# Patient Record
Sex: FEMALE | Race: ASIAN | NOT HISPANIC OR LATINO | Employment: PART TIME | URBAN - METROPOLITAN AREA
[De-identification: names, ages, dates, MRNs, and addresses within clinical notes are randomized per-mention and may not be internally consistent; named-entity substitution may affect disease eponyms.]

---

## 2024-05-15 ENCOUNTER — TELEPHONE (OUTPATIENT)
Dept: TRANSPLANT | Facility: CLINIC | Age: 24
End: 2024-05-15

## 2024-05-20 ENCOUNTER — TELEPHONE (OUTPATIENT)
Dept: TRANSPLANT | Facility: CLINIC | Age: 24
End: 2024-05-20
Payer: COMMERCIAL

## 2024-05-22 ENCOUNTER — TELEPHONE (OUTPATIENT)
Dept: TRANSPLANT | Facility: CLINIC | Age: 24
End: 2024-05-22
Payer: COMMERCIAL

## 2024-05-28 ENCOUNTER — TELEPHONE (OUTPATIENT)
Dept: TRANSPLANT | Facility: CLINIC | Age: 24
End: 2024-05-28
Payer: COMMERCIAL

## 2024-05-29 ENCOUNTER — TELEPHONE (OUTPATIENT)
Dept: TRANSPLANT | Facility: CLINIC | Age: 24
End: 2024-05-29
Payer: COMMERCIAL

## 2024-05-30 ENCOUNTER — TELEPHONE (OUTPATIENT)
Dept: TRANSPLANT | Facility: CLINIC | Age: 24
End: 2024-05-30
Payer: COMMERCIAL

## 2024-06-04 DIAGNOSIS — Z76.82 ORGAN TRANSPLANT CANDIDATE: Primary | ICD-10-CM

## 2024-06-14 ENCOUNTER — TELEPHONE (OUTPATIENT)
Dept: TRANSPLANT | Facility: CLINIC | Age: 24
End: 2024-06-14
Payer: COMMERCIAL

## 2024-06-18 ENCOUNTER — TELEPHONE (OUTPATIENT)
Dept: TRANSPLANT | Facility: CLINIC | Age: 24
End: 2024-06-18
Payer: COMMERCIAL

## 2024-06-18 NOTE — TELEPHONE ENCOUNTER
MA notes per Pre Dialysis adherence form     FOR THE PAST THREE MONTHS:    0-Appt Adhrence  0-No show    No concerns with labs, care giving, transportation, or mental health    Per adherence form pt would be excellent candidate for kidney transplant.     Scanned in pt's media     Luanne Caro  Abdominal Transplant MA

## 2024-06-19 ENCOUNTER — OFFICE VISIT (OUTPATIENT)
Dept: TRANSPLANT | Facility: CLINIC | Age: 24
End: 2024-06-19
Payer: COMMERCIAL

## 2024-06-19 ENCOUNTER — TELEPHONE (OUTPATIENT)
Dept: TRANSPLANT | Facility: CLINIC | Age: 24
End: 2024-06-19
Payer: COMMERCIAL

## 2024-06-19 ENCOUNTER — HOSPITAL ENCOUNTER (OUTPATIENT)
Dept: RADIOLOGY | Facility: HOSPITAL | Age: 24
Discharge: HOME | End: 2024-06-19
Payer: COMMERCIAL

## 2024-06-19 VITALS
TEMPERATURE: 97 F | HEART RATE: 98 BPM | DIASTOLIC BLOOD PRESSURE: 70 MMHG | WEIGHT: 209.69 LBS | HEIGHT: 62 IN | BODY MASS INDEX: 38.59 KG/M2 | RESPIRATION RATE: 16 BRPM | OXYGEN SATURATION: 100 % | SYSTOLIC BLOOD PRESSURE: 148 MMHG

## 2024-06-19 DIAGNOSIS — E28.2 PCOS (POLYCYSTIC OVARIAN SYNDROME): ICD-10-CM

## 2024-06-19 DIAGNOSIS — N18.5 STAGE 5 CHRONIC KIDNEY DISEASE: Chronic | ICD-10-CM

## 2024-06-19 DIAGNOSIS — Q61.5 NEPHRONOPHTHISIS: ICD-10-CM

## 2024-06-19 DIAGNOSIS — Z76.82 ORGAN TRANSPLANT CANDIDATE: ICD-10-CM

## 2024-06-19 DIAGNOSIS — Z01.818 PRE-TRANSPLANT EVALUATION FOR KIDNEY TRANSPLANT: Primary | ICD-10-CM

## 2024-06-19 DIAGNOSIS — I15.0 RENOVASCULAR HYPERTENSION: ICD-10-CM

## 2024-06-19 PROBLEM — I10 HTN (HYPERTENSION): Status: ACTIVE | Noted: 2024-06-19

## 2024-06-19 PROBLEM — D64.9 ANEMIA: Status: ACTIVE | Noted: 2024-06-19

## 2024-06-19 PROBLEM — E87.22 CHRONIC METABOLIC ACIDOSIS: Status: ACTIVE | Noted: 2024-04-02

## 2024-06-19 PROBLEM — J45.20 MILD INTERMITTENT ASTHMA WITHOUT COMPLICATION: Status: ACTIVE | Noted: 2018-11-19

## 2024-06-19 PROBLEM — R80.8 OTHER PROTEINURIA: Status: ACTIVE | Noted: 2024-04-02

## 2024-06-19 PROCEDURE — 71046 X-RAY EXAM CHEST 2 VIEWS: CPT | Mod: TC,TXP

## 2024-06-19 PROCEDURE — 72192 CT PELVIS W/O DYE: CPT | Mod: TC,TXP

## 2024-06-19 PROCEDURE — 99999 PR PBB SHADOW E&M-EST. PATIENT-LVL IV: CPT | Mod: PBBFAC,TXP,, | Performed by: NURSE PRACTITIONER

## 2024-06-19 PROCEDURE — 99204 OFFICE O/P NEW MOD 45 MIN: CPT | Mod: S$GLB,TXP,, | Performed by: PHYSICIAN ASSISTANT

## 2024-06-19 RX ORDER — LISINOPRIL 5 MG/1
1 TABLET ORAL DAILY
COMMUNITY
Start: 2024-04-02

## 2024-06-19 RX ORDER — SODIUM BICARBONATE 650 MG/1
650 TABLET ORAL 2 TIMES DAILY
COMMUNITY
Start: 2024-04-02

## 2024-06-19 RX ORDER — NORELGESTROMIN AND ETHINYL ESTRADIOL 35; 150 UG/MG; UG/MG
1 PATCH TRANSDERMAL WEEKLY
COMMUNITY
Start: 2021-10-18 | End: 2024-06-19

## 2024-06-19 RX ORDER — TAZAROTENE 0.45 MG/G
LOTION TOPICAL NIGHTLY
COMMUNITY

## 2024-06-19 RX ORDER — DAPSONE 75 MG/G
GEL TOPICAL DAILY
COMMUNITY

## 2024-06-19 RX ORDER — NORELGESTROMIN AND ETHINYL ESTRADIOL 35; 150 UG/MG; UG/MG
1 PATCH TRANSDERMAL WEEKLY
COMMUNITY

## 2024-06-19 RX ORDER — ALBUTEROL SULFATE 90 UG/1
1-2 AEROSOL, METERED RESPIRATORY (INHALATION)
COMMUNITY
Start: 2024-03-25

## 2024-06-19 RX ORDER — LORATADINE 10 MG/1
1 TABLET ORAL DAILY PRN
COMMUNITY

## 2024-06-19 NOTE — PROGRESS NOTES
PRE-TRANSPLANT INFECTIOUS DISEASE CONSULT    Reason for Visit:  Pre-transplant evaluation  Referring Provider: Dr. Dio Ruth *     History of Present Illness:    24 y.o. female with a history of CKD not on HD presents for pre-kidney transplant evaluation.    Infectious History:  Recent hospital admissions: No  Recent infections: No  Recent or current antibiotic use: No  History of recurrent infections *(sinus / pneumonia / UTI / SBP)*: No  History of diabetic foot wound or bone/joint infection: No  Recent dental infections, issues or procedures: No  History of chicken pox: No  History of shingles: No  History of STI: No  History of COVID infection: No    History of Immunosuppression:  Prior chemotherapy / immunosuppression: No  Prior transplant: No  History of splenectomy: No    Tuberculosis:  Prior screening for latent TB: No  Prior diagnosis of latent TB: No  Risk factors for TB *known exposure, incarceration, homelessness*: No    Geographical exposures:  Currently lives in [place],  with parents  Lived in the following states: [place], [place] as child (adopted at 1yr)  Lived or travelled to the [facility] [place]: No  International travel: No  Travel-associated illness: No    Social/Environmental:  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  Yes - cooked  Tobacco: No  Alcohol: No  Recreational drug use:  No  Sexual partners: yes - male      Past Histories:   Past Medical History:   Diagnosis Date    Allergy     Seasonal    Anemia     Asthma     Disorder of kidney and ureter     Metabolic acidosis     Nephronophthisis     PCOS (polycystic ovarian syndrome)      Past Surgical History:   Procedure Laterality Date    HERNIA REPAIR      inguinal, as a child    TONSILLECTOMY      WISDOM TOOTH EXTRACTION       Family History   Adopted: Yes    Family history unknown: Yes     Social History     Tobacco Use    Smoking status: Never    Smokeless tobacco: Never   Substance Use Topics    Alcohol use: Not Currently    Drug use: Never     Review of patient's allergies indicates:   Allergen Reactions    Codeine Nausea And Vomiting         Current antibiotics:  Antibiotics (From admission, onward)      None              Review of Systems  Review of Systems   Constitutional: Negative for chills, fever, malaise/fatigue and night sweats.   Cardiovascular:  Negative for chest pain.   Respiratory:  Negative for cough, hemoptysis, shortness of breath, sputum production and wheezing.    Skin:  Negative for rash and suspicious lesions.   Gastrointestinal:  Negative for abdominal pain, constipation, diarrhea, heartburn, nausea and vomiting.   Genitourinary:  Negative for dysuria and hematuria.          Objective  Physical Exam  Constitutional:       General: She is not in acute distress.     Appearance: Normal appearance. She is well-developed. She is not ill-appearing, toxic-appearing or diaphoretic.       HENT:      Head: Normocephalic and atraumatic.      Mouth/Throat:      Lips: Pink. No lesions.      Mouth: Mucous membranes are moist. No injury or oral lesions.      Dentition: Does not have dentures. No gingival swelling, dental caries, dental abscesses or gum lesions.      Tongue: No lesions.      Palate: No lesions.      Pharynx: Oropharynx is clear.   Cardiovascular:      Rate and Rhythm: Normal rate and regular rhythm.      Heart sounds: Normal heart sounds. No murmur heard.     No friction rub. No gallop.   Pulmonary:      Effort: Pulmonary effort is normal. No respiratory distress.      Breath sounds: Normal breath sounds. No wheezing or rales.   Abdominal:      General: Bowel sounds are normal. There is no distension.      Palpations: Abdomen is soft. There is no mass.      Tenderness: There is no abdominal tenderness. There is no guarding or rebound.  "  Skin:     General: Skin is warm and dry.   Neurological:      Mental Status: She is alert and oriented to person, place, and time.   Psychiatric:         Behavior: Behavior normal.         Labs:    CBC:   Lab Results   Component Value Date    WBC 12.33 06/19/2024    HGB 8.7 (L) 06/19/2024    HCT 30.3 (L) 06/19/2024    MCV 71 (L) 06/19/2024     06/19/2024    GRAN 8.6 (H) 06/19/2024    GRAN 69.5 06/19/2024    LYMPH 3.0 06/19/2024    LYMPH 24.0 06/19/2024    MONO 0.5 06/19/2024    MONO 4.2 06/19/2024    EOSINOPHIL 1.7 06/19/2024       Syphilis screening:   Lab Results   Component Value Date    TREPABIGMIGG Nonreactive 06/19/2024        TB screening: No results found for: "TBGOLDPLUS", "TSPOTSCREN"    HIV screening:   Lab Results   Component Value Date    JCF65IERS Non-reactive 06/19/2024       Strongyloides IgG: No results found for: "STRONGANTIGG"    Hepatitis Serologies:   Lab Results   Component Value Date    HEPAIGG Non-reactive 06/19/2024    HEPBCAB Reactive (A) 06/19/2024    HEPBSAB <3.00 06/19/2024    HEPBSAB Non-reactive 06/19/2024    HEPCAB Non-reactive 06/19/2024        Varicella IgG:   Lab Results   Component Value Date    VARICELLAINT Positive 06/19/2024         Immunization History   Administered Date(s) Administered    DTaP 04/02/2001, 06/04/2001, 08/03/2001, 04/15/2002, 06/24/2005    HPV 9-Valent 09/13/2016, 03/16/2017    Hepatitis B, Pediatric/Adolescent 08/03/2001, 04/15/2002, 08/28/2002    HiB PRP-OMP 04/02/2001, 06/04/2001    IPV 04/02/2001, 06/04/2001, 04/15/2002, 06/24/2005    Influenza - Quadrivalent - PF *Preferred* (6 months and older) 10/12/2015, 10/10/2016, 11/19/2018, 11/22/2019    MMR 08/28/2002, 06/24/2005    Meningococcal Conjugate (MCV4P) 07/12/2011    Pneumococcal Conjugate - 7 Valent 04/02/2001, 06/04/2001, 08/03/2001    Tdap 07/12/2011    Varicella 08/03/2001, 10/28/2008        Immunization History:  Received all childhood vaccines: Yes  All household members receive annual " flu vaccine: Yes  All household members are up to date on COVID vaccine: Yes    Assessment and Plan    1. Risks of Infection: Available serologies were reviewed. No unusual risks of infection or significant barriers to transplantation were identified from the infectious disease standpoint given the information available at this time EXCEPT Hep B testing is positive and requires Hep B PCR to assess for active infection and needs Hepatology evaluation for clearance for transplant.   - Acute infectious issues: None except needs Hep B work up   - Pending serologies: Quantiferon gold / T-spot, Strongyloides IgG, and VZV IgG, Hep B PCR   - Please call if any pending serologic testing is positive.    2. Immunizations:  Based on the patient's immunization history and serologies, the following immunizations are recommended:  - Hepatitis A    Patient does not have immunity to hepatitis A    Vaccination ordered today: Yes   - Hepatitis B    Patient does not have immunity to hepatitis B    Vaccination ordered today: No. Reason for not ordering: HBSAg and HBcAb are + and had Hep b vaccine in past and remains HBSAb remains negative.  Needs Hepatology eval.   - COVID    Current [facility] vaccination recommendations were discussed with the patient   - Annual high dose influenza     Vaccination ordered today: No. Reason for not ordering: Other   - Prevnar 20    Vaccination ordered today: Yes   - Tdap    Vaccination ordered today: Yes   - Shingrix    Vaccination ordered today: Yes   - COVID    Vaccination ordered.    Recommended Pre-Transplant Immunization Schedule   Vaccine  0m 1m 2m 6m   Pneumococcal conjugate vaccine (Prevnar 20) X      Tetanus-diphtheria-pertussis (Tdap)* X      Hepatitis A Vaccine (Havrix)** X   X   Hepatitis B Vaccine (Heplisav)** X X     Influenza (annual) X      Zoster Recombinant Vaccine (Shingrix) X  X           *Administer booster every 10 years.       **Administer if no immunity demonstrated on serologies                Patient will receive vaccines at local pharmacy. A written prescription was provided for all vaccine doses.     3. Counseling:   I discussed with the patient the risk for increased susceptibility to infections following transplantation including increased risk for infection right after transplant and if rejection should occur.  The patient has been counseled on the importance of vaccinations to decrease risk of infection and severe illness. Specific guidance has been provided to the patient regarding the patient's occupation, hobbies and activities to avoid future infectious complications.     4. Transplant Candidacy: Based on available information, there are no identified significant barriers to transplantation from an infectious disease standpoint.  Final determination of transplant candidacy will be made once evaluation is complete and reviewed by the Selection Committee.      Follow up with infectious disease as needed.       The total time for evaluation and management services performed on 06/19/2024 was greater than 45 minutes.

## 2024-06-19 NOTE — PROGRESS NOTES
"TRANSPLANT NUTRITIONAL ASSESSMENT    Referring Provider: Dio Ruth III, MD     Reason for Visit: Pre-kidney transplant work-up (pre-dialysis)    Age: 24 y.o.  Sex: female    Patient Active Problem List   Diagnosis    Stage 5 chronic kidney disease    PCOS (polycystic ovarian syndrome)    Other proteinuria    Nephronophthisis    Mild intermittent asthma without complication    HTN (hypertension)    Chronic metabolic acidosis    Anemia     Past Medical History:   Diagnosis Date    Allergy     Seasonal    Anemia     Asthma     Disorder of kidney and ureter     Metabolic acidosis     Nephronophthisis     PCOS (polycystic ovarian syndrome)      Lab Results   Component Value Date    GLU 81 06/19/2024    K 4.9 06/19/2024    PHOS 7.4 (H) 06/19/2024    CHOL 126 06/19/2024    HDL 32 (L) 06/19/2024    TRIG 120 06/19/2024    ALBUMIN 3.7 06/19/2024    CALCIUM 8.8 06/19/2024     Other Pertinent Labs: N/A    Current Outpatient Medications   Medication Sig    albuterol (PROVENTIL/VENTOLIN HFA) 90 mcg/actuation inhaler Inhale 1-2 puffs into the lungs as needed for Shortness of Breath.    dapsone (ACZONE) 7.5 % GlwP Apply topically once daily.    lisinopriL (PRINIVIL,ZESTRIL) 5 MG tablet Take 1 tablet by mouth once daily.    loratadine (CLARITIN) 10 mg tablet Take 1 tablet by mouth daily as needed for Allergies.    norelgestromin-ethinyl estradiol 150-35 mcg/24 hr Place 1 patch onto the skin once a week.    sodium bicarbonate 650 MG tablet Take 650 mg by mouth 2 (two) times daily.    tazarotene (ARAZLO) 0.045 % Lotn Apply topically every evening.     No current facility-administered medications for this visit.     Allergies: Codeine    Ht Readings from Last 1 Encounters:   06/19/24 5' 2.01" (1.575 m)     Wt Readings from Last 1 Encounters:   06/19/24 95.1 kg (209 lb 10.5 oz)      BMI: Body mass index is 38.34 kg/m².    Usual Weight: 215 lbs   Weight Change/Time: 2.7% unintentional wt loss x 6 months; not significant "   Current Diet: regular   Appetite/Current Intake: poor x 6 months   Exercise/Physical Activity: functional in ADLs; gym 3x/wk (treadmill 30 mins, weights)   Nutritional/Herbal Supplements: MVI occasionally   Chewing/Swallowing Problems: none  Symptoms: none     Estimated Kcal Need: 1902 kcal/day (20 kcal/kg)   Estimated Protein Need: 76-86 gm/day (0.8-0.9 gm/kg)     Nutritional History:   Pt and mother present. Pt reports consuming 1-2 meals/day with little snacking.     Diet Recall    Morning: typically skips; oatmeal or eggs and grits     Midday/Evening: salad (grilled chicken, cucumber, green onions, cheese, Italian or Ranch dressing), burger (cheese, ketchup, white bun) with fries, steak with potatoes and a side salad     Snacks: cheese blade, sunchips, mixed nuts, pretzels with or without spinach dip, ritz crackers and cheese, fruit (banana, apples, grapes, oranges, pineapple, watermelon), carrots, celery, yoplait yogurt, peanut butter and apples     Beverages: 4-5 36 oz water/day, coke every other day, oscar D       Seasonings: salt, herbs and spices, Mike[employer]     Restaurants/Fast Food: 1xwk; usually a drive thru       Nutritional Diagnoses  Problem: food- and nutrition-related knowledge deficit  Etiology: RT lack of previous education on pre-kidney transplant nutrition recommendations  Symptoms: AEB diet recall and questions from pt     Educational Need? yes  Barriers: none identified  Discussed with: patient and mother  Interventions: Patient taught nutrition information regarding Pre-kidney transplant work-up (pre-dialysis). Renal Nutrition Therapy packet reviewed (high/low food sources of K, Phos and protein, low sodium and fluid intake, emphasis on moderate protein intake). Discussed monitoring CHO intake and increasing non starchy vegetable intake. Encouraged physical activity daily, regular exercise as tolerated, stay mobile.   Goals/Recommendations: diet adherence  Actions Taken: instruct/provide written  information  Patient and/or family comprehend instructions: yes  Outcome: Verbalizes understanding  Monitoring: Contact information provided, will f/u in clinic and communicate with the care team as needed.     Counseling Time: 20 minutes

## 2024-06-19 NOTE — PROGRESS NOTES
INITIAL PATIENT EDUCATION NOTE     Ms. Carly Wilson was seen in pre-kidney transplant clinic for evaluation for kidney, kidney/pancreas or pancreas only transplant.  The patient attended an individual video education session that discussed/reviewed the following aspects of transplantation: evaluation including diagnostic and laboratory testing,(Chemistries, Hematology, Serologies including HIV and Hepatitis and HLA) required for transplantation and selection committee process, UNOS waitlist management/multiple listings, types of organs offered (KDPI < 85%, KDPI > 85%, PHS risk, DCD, HCV+, HIV+ for HIV+ recipients and enbloc/dual), financial aspects, surgical procedures, dietary instruction pre- and post-transplant, health maintenance pre- and post-transplant, post-transplant hospitalization and outpatient follow-up, potential to participate in a research protocol, and medication management and side effects.  A question and answer session was provided after the presentation.    The patient was seen by all members of the multi-disciplinary team to include: Nephrologist/DAIANA, Surgeon, , Transplant Coordinator, , Pharmacist and Dietician (if applicable).    The patient reviewed and signed all consents for evaluation which were witnessed and sent to scanning into the [facility] chart.    The patient was given an education book and plan for further evaluation based on her individual assessment.      Reviewed program requirement for complete COVID vaccination with documentation prior to listing.  COVID education information reviewed with patient. Patient encouraged to be up to date on all vaccinations.     The patient was informed that the transplant team would manage immediate post op pain. If the patient requires long term pain management, they will need to have that pain management addressed by their PCP or previous provider who wrote for long term pain medicines.    The patient was encouraged to  call with any questions or concerns.

## 2024-06-19 NOTE — LETTER
June 21, 2024        Dio Ruth III  [address] [place] [address] [address]  [place] [address]  Phone: [phone]  Fax: [phone]             [facility] 1st Fl  [address]  [facility] [address]  Phone: [phone]   Patient: Carly Wilson   MR Number: [id number]   YOB: 2000   Date of Visit: 6/19/2024       Dear Dr. Dio Ruth III    Thank you for referring Carly Wilson to me for evaluation. Attached you will find relevant portions of my assessment and plan of care.    If you have questions, please do not hesitate to call me. I look forward to following Carly Wilson along with you.    Sincerely,    Vivian Lyons NP    Enclosure    If you would like to receive this communication electronically, please contact [email or web address] or [phone] to request inContact Link access.    inContact Link is a tool which provides read-only access to select patient information with whom you have a relationship. Its easy to use and provides real time access to review your patients record including encounter summaries, notes, results, and demographic information.    If you feel you have received this communication in error or would no longer like to receive these types of communications, please e-mail [email or web address]

## 2024-06-19 NOTE — PROGRESS NOTES
Transplant Recipient Adult Psychosocial Assessment    Carly Wilson  [address] [facility] [address]  Telephone Information:   Mobile [phone]   Mobile [phone]   Home  There is no home phone number on file.  Work  There is no work phone number on file.  E-mail  [email or web address]    Sex: female  YOB: 2000  Age: 24 y.o.    Encounter Date: 6/19/2024  [place] Citizen: yes  Primary Language: English   Needed: no    Emergency Contact:  Name: Tahmina Wilson  Relationship: mother  Address: same as patient   Phone Numbers:   [phone] (work), [phone] (mobile)    Family/Social Support:   Number of dependents/: Patient denied   Marital history: Patient denied   Other family dynamics: Patient reports having a supportive mother and father, patient reports she does not have siblings. Patient reports benefiting from supportive friends and her boyfriends support.     Household Composition:  Name: Damon Walkerb   Age: 63  Relationship: father  Does person drive? yes    Name: Tahmina Wilson  Age: 67  Relationship: mother  Does person drive? yes    Name: Jeffrey Nelson ([phone])  Age: 23  Relationship: significant other  Does person drive? yes    Do you and your caregivers have access to reliable transportation? yes  PRIMARY CAREGIVER: Tahmina Wilson will be primary caregiver, phone number [phone].      provided in-depth information to patient and caregiver regarding pre- and post-transplant caregiver role.   strongly encourages patient and caregiver to have concrete plan regarding post-transplant care giving, including back-up caregiver(s) to ensure care giving needs are met as needed.    Patient and Caregiver states understanding all aspects of caregiver role/commitment and is able/willing/committed to being caregiver to the fullest extent necessary.    Patient and Caregiver verbalizes understanding of the education provided today and caregiver responsibilities.          remains available. Patient and Caregiver agree to contact  in a timely manner if concerns arise.      Able to take time off work without financial concerns: yes.     Additional Significant Others who will Assist with Transplant:  Name: Damon Wilson ([phone])  Age: 63  City: [place] State: [place]  Relationship: father  Does person drive? yes    Living Will: no  Healthcare Power of : no  Advance Directives on file: <<no information> per medical record.  Verbally reviewed LW/HCPA information.   provided patient with copy of LW/HCPA documents and provided education on completion of forms.    Living Donors: Education and resource information given to patient.    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     Spouse/Significant Other Employment: Patients mother is employed as a banker.     Disabled: no    Monthly Income:    Patient receives support from her parents who she resides with. When she starts work in August patient reports she will be making $20956 per year.     Able to afford all costs now and if transplanted, including medications: yes  Patient and Caregiver verbalizes understanding of personal responsibilities related to transplant costs and the importance of having a financial plan to ensure that patients transplant costs are fully covered.      provided fundraising information/education.  Patient and Caregiver verbalizes understanding.   remains available.    Insurance:   Payer/Plan Subscr  Sex Relation Sub. Ins. ID Effective Group Num   1. BLUE CROSS BL*  "NOEL JAY S [birth year]/1900 Female Child [id number] 9/1/[birth year] [id number]                                   [address] [address]     Primary Insurance (for UNOS reporting): Private Insurance  Secondary Insurance (for UNOS reporting): None  Patient and Caregiver verbalizes clear understanding that patient may experience difficulty obtaining and/or be denied insurance coverage post-surgery. This includes and is not limited to disability insurance, life insurance, health insurance, burial insurance, long term care insurance, and other insurances.    Patient and Caregiver also reports understanding that future health concerns related to or unrelated to transplantation may not be covered by patient's insurance.  Resources and information provided and reviewed.      Patient and Caregiver provides verbal permission to release any necessary information to outside resources for patient care and discharge planning.  Resources and information provided are reviewed.      Dialysis Adherence:  Patient reports she is pre-dialysis.    Infusion Service: patient utilizing? yes , patient reports "I receive hemoglobin shots from my doctor".  Home Health: patient utilizing? no  DME: no  Pulmonary/Cardiac Rehab: Patient denied    ADLS:  Patient reports she is independent in her ADLs and does drive.     Adherence:   Patient reports being adherent to her medical care.  Adherence education and counseling provided.     Per History Section:  Past Medical History:   Diagnosis Date    Allergy     Seasonal    Anemia     Asthma     Disorder of kidney and ureter     Metabolic acidosis     Nephronophthisis     PCOS (polycystic ovarian syndrome)      Social History     Tobacco Use    Smoking status: Never    Smokeless tobacco: Never   Substance Use Topics    Alcohol use: Not Currently     Social History     Substance and Sexual Activity   Drug Use Never     Social History     Substance and Sexual Activity   Sexual Activity Yes       Per Today's " "Psychosocial:  Tobacco: none, patient denies any use.  Alcohol: none, patient denies any use.  Illicit Drugs/Non-prescribed Medications: none, patient denies any use.    Patient and Caregiver states clear understanding of the potential impact of substance use as it relates to transplant candidacy and is aware of possible random substance screening.  Substance abstinence/cessation counseling, education and resources provided and reviewed.     Arrests/DWI/Treatment/Rehab: patient denies    Psychiatric History:    Mental Health:  Patient reports "some anxiety". Patient stated that she is managing it well with self care and her families support. Patient denied needing or wanting resources or referrals at this time.   Psychiatrist/Counselor: Patient denied   Medications:  Patient denied   Suicide/Homicide Issues: Patient denied current or past thoughts of suicide or homicide.    Safety at home: Patient reports feeling safe in her home.     Knowledge: Patient and Caregiver states having clear understanding and realistic expectations regarding the potential risks and potential benefits of organ transplantation and organ donation, agrees to discuss with health care team members and support system members, and to utilize available resources and express questions and/or concerns in order to further facilitate the pt informed decision-making.  Resources and information provided and reviewed.     Patient and Caregiver is aware of [facility]'s affiliation and/or partnership with agencies in home health care, LTAC, SNF, [facility], and other hospitals and clinics.    Understanding: Patient and Caregiver reports having a clear understanding of the many lifetime commitments involved with being a transplant recipient, including costs, compliance, medications, lab work, procedures, appointments, concrete and financial planning, preparedness, timely and appropriate communication of concerns, abstinence (ETOH, tobacco, illicit non-prescribed " drugs), adherence to all health care team recommendations, support system and caregiver involvement, appropriate and timely resource utilization and follow-through, mental health counseling as needed/recommended, and patient and caregiver responsibilities.  Social Service Handbook, resources and detailed educational information provided and reviewed.  Educational information provided.    Patient and Caregiver also reports current and expected compliance with health care regime and states having a clear understanding of the importance of compliance.      Patient and Caregiver reports a clear understanding that risks and benefits may be involved with organ transplantation and with organ donation.      Patient and Caregiver also reports clear understanding that psychosocial risk factors may affect patient, and include but are not limited to feelings of depression, generalized anxiety, anxiety regarding dependence on others, post traumatic stress disorder, feelings of guilt and other emotional and/or mental concerns, and/or exacerbation of existing mental health concerns.  Detailed resources provided and discussed.     Patient and Caregiver agrees to access appropriate resources in a timely manner as needed and/or as recommended, and to communicate concerns appropriately.  Patient and Caregiver also reports a clear understanding of treatment options available.      reviewed education, provided additional information, and answered questions.    Feelings or Concerns: Patient denied additional concerns at this time.     Coping: Identify Patient & Caregiver Strategies to [place]:   1. In the past - Spending time with animals, fishing, friend & family support.    2. Currently & Pre-transplant - Spending time with animals, fishing, friend & family support.    3. At the time of surgery - Friend and family support    4. During post-Transplant & Recovery Period - Friend and family support     Goals: Patient reports her  goal is to spend less time in doctors offices.  Patient referred to Vocational Rehabilitation.    Interview Behavior: Patient and Caregiver presents as alert and oriented x 4, pleasant, good eye contact, well groomed, recall good, concentration/judgement good, average intelligence, calm, communicative, cooperative, and asking and answering questions appropriately.          Transplant Social Work - Candidacy  Assessment/Plan:     Psychosocial Suitability: Patient presents as a suitable candidate for kidney transplant at this time. Based on psychosocial risk factors, patient presents as low risk. Patient reports suitable care giving and transportation plan. Patient denied additional psychosocial concerns. Final candidacy to be determined by the transplant selection committee.     Recommendations/Additional Comments: Patient and caregiver report understanding that they are responsible for their own lodging and transportation plan.  recommends patient remain abstinent from etoh, tobacco, and other substances.  recommends patient engage in fundraising to assist with costs. Patient and caregiver agree to contact transplant team with needs, questions, or concerns.     Stephanie Mario LMSW

## 2024-06-19 NOTE — PROGRESS NOTES
MYCOPHENOLATE REMS PROGRAM:    I reviewed the mycophenolate REMS program with the patient.  Provided the mycophenolate REMS Guide to the patient.  Patient verbalized understanding and had the opportunity to ask questions.    Patient states she has had surgery for permanent contraception.  An acknowledgement form was not needed for this patient.

## 2024-06-19 NOTE — PROGRESS NOTES
PHARM.D. PRE-TRANSPLANT NOTE:    This patient's medication therapy was evaluated as part of her pre-transplant evaluation.      The following general pharmacologic concerns were noted: none    The following concerns for post-operative pain management were noted: none    The following pharmacologic concerns related to HCV therapy were noted: none      This patient's medication profile was reviewed for considerations for DAA Hepatitis C therapy:    [x]  No current inducers of CYP 3A4 or PGP  [x]  No amiodarone on this patient's EMR profile in the last 24 months  [x]  No past or current atrial fibrillation on this patient's EMR profile       Current Outpatient Medications   Medication Sig Dispense Refill    albuterol (PROVENTIL/VENTOLIN HFA) 90 mcg/actuation inhaler Inhale 1-2 puffs into the lungs as needed for Shortness of Breath.      dapsone (ACZONE) 7.5 % GlwP Apply topically once daily.      lisinopriL (PRINIVIL,ZESTRIL) 5 MG tablet Take 1 tablet by mouth once daily.      loratadine (CLARITIN) 10 mg tablet Take 1 tablet by mouth daily as needed for Allergies.      norelgestromin-ethinyl estradiol 150-35 mcg/24 hr Place 1 patch onto the skin once a week.      sodium bicarbonate 650 MG tablet Take 650 mg by mouth 2 (two) times daily.      tazarotene (ARAZLO) 0.045 % Lotn Apply topically every evening.       No current facility-administered medications for this visit.           I am available for consultation and can be contacted, as needed by the other members of the Transplant team.

## 2024-06-19 NOTE — PROGRESS NOTES
Transplant Surgery  Kidney Transplant Recipient Evaluation    Referring Physician: Dio Ruth III  Current Nephrologist: Dio Ruth III    Subjective:     Reason for Visit: evaluate transplant candidacy    History of Present Illness: Carly Wilson is a 24 y.o. year old female undergoing transplant evaluation.    Dialysis History: Carly is pre-dialysis.      Transplant History: N/A    Etiology of Renal Disease: Hypertensive Nephrosclerosis (based on medical records from referral).    External provider notes reviewed: Yes    Review of Systems   Constitutional:  Negative for activity change, appetite change, chills, fatigue and fever.   HENT:  Negative for sore throat and trouble swallowing.    Eyes:  Negative for visual disturbance.   Respiratory:  Negative for cough, chest tightness and shortness of breath.    Cardiovascular:  Negative for chest pain, palpitations and leg swelling.   Gastrointestinal:  Negative for abdominal distention, abdominal pain, blood in stool, constipation, diarrhea, nausea and vomiting.   Endocrine: Negative for polyuria.   Genitourinary:  Negative for decreased urine volume, difficulty urinating, dysuria, flank pain, frequency and hematuria.   Musculoskeletal:  Negative for gait problem, myalgias and neck stiffness.   Skin:  Negative for rash and wound.   Neurological:  Negative for dizziness, tremors, seizures, weakness, light-headedness and headaches.   Hematological:  Negative for adenopathy.   Psychiatric/Behavioral:  Negative for agitation, confusion and sleep disturbance.      Objective:     Physical Exam:  Constitutional:   Vitals reviewed: yes   Well-nourished and well-groomed: yes  Eyes:   Sclerae icteric: no   Extraocular movements intact: yes  GI:    Bowel sounds normal: yes   Tenderness: no    If yes, quadrant/location: not applicable   Palpable masses: no    If yes, quadrant/location: not applicable   Hepatosplenomegaly: no   Ascites: no   Hernia: no    If yes,  type/location: not applicable   Surgical scars: yes    If yes, type/location: RIH repair  Resp:   Effort normal: yes   Breath sounds normal: yes    CV:   Regular rate and rhythm: yes   Heart sounds normal: yes   Femoral pulses normal: yes   Extremities edematous: no  Skin:   Rashes or lesions present: no    If yes, describe:not applicable   Jaundice:: no    Musculoskeletal:   Gait normal: yes   Strength normal: yes  Psych:   Oriented to person, place, and time: yes   Affect and mood normal: yes    Additional comments: not applicable    Diagnostics:  The following labs have been reviewed: CBC  CMP  PT  INR  PTT  UA  The following radiology images have been independently reviewed and interpreted: pending    Counseling: We provided Carly Wilson with a group education session today.  We discussed kidney transplantation at length with her, including risks, potential complications, and alternatives in the management of her renal failure.  The discussion included complications related to anesthesia, bleeding, infection, primary nonfunction, and ATN.  I discussed the typical postoperative course, length of hospitalization, the need for long-term immunosuppression, and the need for long-term routine follow-up.  I discussed living-donor and -donor transplantation and the relative advantages and disadvantages of each.  I also discussed average waiting times for both living donation and  donation.  I discussed national and center-specific survival rates.  I also mentioned the potential benefit of multicenter listing to candidates listed with centers within more than one organ procurement organization.  All questions were answered.    Patient advised that it is recommended that all transplant candidates, and their close contacts and household members receive Covid vaccination.    Final determination of transplant candidacy will be made once evaluation is complete and reviewed by the Kidney & Kidney/Pancreas  Selection Committee.    Coronavirus disease (COVID-19) caused by severe acute respiratory virus coronavirus 2 (SARS-C0V 2) is associated with increased mortality in solid organ transplant recipients (SOT) compared to non-transplant patients. Vaccine responses to vaccination are depressed against SARS-CoV2 compared to normal individuals but improve with third vaccination doses. Vaccination prior to SOT provides both the best opportunity for transplant candidates to develop protective immunity and to reduce the risk of serious COVID19 infections post transplantation. Organ transplant candidates at [facility] will be required to receive SARS-CoV-2 vaccination prior to being listed with a an active status, whenever possible. Exceptions will be made for disability related reasons or for sincerely held [employer] beliefs.          Transplant Surgery - Candidacy   Assessment/Plan:   Carly Wilson is pre-dialysis with CKD stage 4 (GFR 15-29 mL/min). I see no surgical contraindication to placing a kidney transplant. Based on available information, Carly Wilson is a suitable kidney transplant candidate.     Additional testing to be completed according to the Written Order Guidelines for Adult Pre-kidney and Pancreas Transplant Evaluation (KI-02).  Interpretation of tests and discussion of patient management involves all members of the multidisciplinary transplant team.    Lora Dumont MD

## 2024-06-19 NOTE — PROGRESS NOTES
Transplant Nephrology  Kidney Transplant Recipient Evaluation    Referring Physician: Dio Ruth III  Current Nephrologist: Dio Ruth III    Subjective:   CC:  Initial evaluation of kidney transplant candidacy.    HPI:  Ms. Wilson is a 24 y.o. year old  female who has presented to be evaluated as a potential kidney transplant recipient.  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  Patient is currently pre-dialysis. Has consult for PD cathter placement.    Only significant medical history prior to this year was PCOS for which she was seeing OBGYN. Found to have creatinine 4.2 in March of this year when saw primary care for sinus congestion complaints. Established with nephrology, genetic testing suggestive of nephronophthisis. She is adopted with unknown family history. She feels well without complaints. No recent illness. Vasculature was not suitable for fistula placement, has visit scheduled for PD catheter.    Recent college graduate and just got her first job to be a . Remains active without any physical impairments. Has been working on weight loss, not frail. Has several potential living donors.    Denies MI, CVA, DVT/PE, or malignancy history.     Current Outpatient Medications   Medication Sig Dispense Refill    albuterol (PROVENTIL/VENTOLIN HFA) 90 mcg/actuation inhaler Inhale 1-2 puffs into the lungs as needed for Shortness of Breath.      dapsone (ACZONE) 7.5 % GlwP Apply topically once daily.      lisinopriL (PRINIVIL,ZESTRIL) 5 MG tablet Take 1 tablet by mouth once daily.      loratadine (CLARITIN) 10 mg tablet Take 1 tablet by mouth daily as needed for Allergies.      norelgestromin-ethinyl estradiol 150-35 mcg/24 hr Place 1 patch onto the skin once a week.      sodium bicarbonate 650 MG tablet Take 650 mg by mouth 2 (two) times daily.      tazarotene (ARAZLO) 0.045 % Lotn Apply topically every evening.    "    No current facility-administered medications for this visit.       Past Medical History:   Diagnosis Date    Allergy     Seasonal    Anemia     Asthma     Disorder of kidney and ureter     Metabolic acidosis     Nephronophthisis     PCOS (polycystic ovarian syndrome)        Past Medical and Surgical History: Ms. Wilson  has a past medical history of Allergy, Anemia, Asthma, Disorder of kidney and ureter, Metabolic acidosis, Nephronophthisis, and PCOS (polycystic ovarian syndrome).  She has a past surgical history that includes Hernia repair; [place] tooth extraction; and Tonsillectomy.    Past Social and Family History: Ms. Wilson reports that she has never smoked. She has never used smokeless tobacco. She reports that she does not currently use alcohol. She reports that she does not use drugs. Her She was adopted. Family history is unknown by patient.    Review of Systems   Constitutional:  Positive for appetite change (decreased) and fatigue. Negative for activity change and fever.   Eyes:  Negative for visual disturbance.   Respiratory:  Negative for shortness of breath.    Cardiovascular:  Negative for chest pain and leg swelling.   Gastrointestinal:  Negative for constipation, diarrhea and nausea.   Genitourinary:  Negative for difficulty urinating, frequency and hematuria.   Musculoskeletal:  Negative for arthralgias and myalgias.   Skin:  Negative for wound.   Neurological:  Negative for weakness and numbness.   Psychiatric/Behavioral:  Negative for sleep disturbance. The patient is not nervous/anxious.        Objective:   Blood pressure (!) 148/70, pulse 98, temperature 97.2 °F (36.2 °C), temperature source Temporal, resp. rate 16, height 5' 2.01" (1.575 m), weight 95.1 kg (209 lb 10.5 oz), SpO2 100%.body mass index is 38.34 kg/m².    Physical Exam  Vitals and nursing note reviewed.   Constitutional:       Appearance: Normal appearance.   Cardiovascular:      Rate and Rhythm: Normal rate and regular rhythm. "      Heart sounds: Normal heart sounds.   Pulmonary:      Effort: Pulmonary effort is normal.      Breath sounds: Normal breath sounds.   Abdominal:      General: There is no distension.   Musculoskeletal:         General: Normal range of motion.   Skin:     General: Skin is warm and dry.   Neurological:      Mental Status: She is alert.         Labs:  Lab Results   Component Value Date    WBC 12.33 06/19/2024    HGB 8.7 (L) 06/19/2024    HCT 30.3 (L) 06/19/2024     06/19/2024    K 4.9 06/19/2024     (H) 06/19/2024    CO2 8 (LL) 06/19/2024    BUN 67 (H) 06/19/2024    CREATININE 7.2 (H) 06/19/2024    EGFRNORACEVR 7.6 (A) 06/19/2024    CALCIUM 8.8 06/19/2024    PHOS 7.4 (H) 06/19/2024    ALBUMIN 3.7 06/19/2024    AST 11 06/19/2024    ALT 17 06/19/2024    .3 (H) 06/19/2024       Labs were reviewed with the patient.    Assessment:     1. Pre-transplant evaluation for kidney transplant    2. Stage 5 chronic kidney disease    3. Nephronophthisis    4. PCOS (polycystic ovarian syndrome)    5. Renovascular hypertension    6. BMI 38.0-38.9,adult      Plan:   24 y.o. year old  female who has presented to be evaluated as a potential kidney transplant recipient.  She has advanced kidney disease secondary to nephronophthisis, diagnosed through genetic testing.  Patient is currently pre-dialysis. She is very motivated for transplant and has already scheduled appointments for cardiac testing and GYN visit. OK to start working up donors.     Critical CO2 8 on morning labs discussed with both Dr. Coello as well as her general nephrologist Dr. Ruth. She feels well and will follow up with Dr. Ruth following testing to determine plan.     Transplant Candidacy:   Based on available information, Ms. Wilson is a suitable kidney transplant candidate.   Meets center eligibility for accepting HCV+ donor offer - Yes.  Patient educated on HCV+ donors. Carly is willing to accept HCV+ donor offer - Yes   Patient is a  candidate for KDPI > 85 kidney donor offer - No (age)  Final determination of transplant candidacy will be made once workup is complete and reviewed by the selection committee.    Patient advised that it is recommended that all transplant candidates, and their close contacts and household members receive Covid vaccination.    UNOS Patient Status  Functional Status: 90% - Able to carry on normal activity: minor symptoms of disease      Vivian Lyons, NP

## 2024-06-27 ENCOUNTER — DOCUMENTATION ONLY (OUTPATIENT)
Dept: TRANSPLANT | Facility: CLINIC | Age: 24
End: 2024-06-27
Payer: COMMERCIAL

## 2024-07-09 ENCOUNTER — LAB VISIT (OUTPATIENT)
Dept: LAB | Facility: HOSPITAL | Age: 24
End: 2024-07-09
Payer: COMMERCIAL

## 2024-07-09 DIAGNOSIS — Z01.818 PRE-TRANSPLANT EVALUATION FOR KIDNEY TRANSPLANT: ICD-10-CM

## 2024-07-09 DIAGNOSIS — Z01.818 PRE-TRANSPLANT EVALUATION FOR KIDNEY TRANSPLANT: Primary | ICD-10-CM

## 2024-07-09 PROCEDURE — 86825 HLA X-MATH NON-CYTOTOXIC: CPT | Mod: TXP | Performed by: NURSE PRACTITIONER

## 2024-07-09 PROCEDURE — 86825 HLA X-MATH NON-CYTOTOXIC: CPT | Mod: 91,TXP | Performed by: NURSE PRACTITIONER

## 2024-07-10 LAB
B CELL RESULTS - XM ALLO: NEGATIVE
B MCS AVERAGE - XM ALLO: 19.7
DONOR MRN: NORMAL
FXMAL TESTING DATE: NORMAL
HLA FLOW CROSSMATCH (ALLO) INTERPRETATION: NORMAL
SERUM COLLECTION DT - XM ALLO: NORMAL
T CELL RESULTS - XM ALLO: NEGATIVE
T MCS AVERAGE - XM ALLO: -26.8

## 2024-09-30 ENCOUNTER — EPISODE CHANGES (OUTPATIENT)
Dept: TRANSPLANT | Facility: CLINIC | Age: 24
End: 2024-09-30

## 2024-10-01 ENCOUNTER — TELEPHONE (OUTPATIENT)
Dept: TRANSPLANT | Facility: CLINIC | Age: 24
End: 2024-10-01
Payer: COMMERCIAL

## 2024-10-01 NOTE — TELEPHONE ENCOUNTER
Clearance letter faxed to Dr Zuniga as requested.  ----- Message from Geraldine James sent at 9/30/2024  3:28 PM CDT -----  Regarding: FW: Patient advice  Contact: Pt  [phone]    ----- Message -----  From: Kalyan Calderon RN  Sent: 9/30/2024   2:34 PM CDT  To: [facility] Pre-Kidney Transplant Clinical  Subject: FW: Patient advice                                 ----- Message -----  From: Paz Fernandez  Sent: 9/30/2024  10:05 AM CDT  To: [facility] Post-Kidney Transplant Non-Clinical  Subject: Patient advice                                           Name of Caller: Carly      Contact Preference:[phone]    Nature of Call: Requesting  paperwork needed for clearance please fax to [phone] Dr. Zuniga at [facility]

## 2024-10-01 NOTE — LETTER
October 1, 2024    Carly Wilson  [address] [address]             Dear Dr. Zuniga at [facility]  fax to [phone]     Patient: Carly Wilson   MR Number: [id number]   YOB: 2000     This patient is being considered for kidney/pancreas transplantation at [facility].  As part of our protocol, we require that this patient receive a letter of clearance and/or recommendations from you regarding this patients disease process as it relates to transplantation.  If this patient is cleared from your standpoint to undergo transplantation, please fax to our office any test you performed and your letter of clearance to [phone].    If you have any questions or concerns, please feel free to contact us at [phone].    Sincerely,      Geraldine James RN  [facility]  [address] [place] [address]  [phone]

## 2024-11-04 ENCOUNTER — TELEPHONE (OUTPATIENT)
Dept: TRANSPLANT | Facility: CLINIC | Age: 24
End: 2024-11-04
Payer: COMMERCIAL

## 2024-11-04 NOTE — TELEPHONE ENCOUNTER
Notified pt via [facility] portal that abdominal u/s io needed for transplant eval and 2nd abo needed  for listing.  ----- Message from Catherine Morris sent at 11/1/2024 12:45 PM CDT -----  Hi Geraldine,  Could you please follow up with this patient regarding what else she needs to be done with her eval?  Thanks

## 2024-11-04 NOTE — LETTER
November 4, 2024    Carly Wilson  [address] [address]             Dear Dayanna Chacko III, Primary Doctor    Patient: Carly Wilson   MR Number: [id number]   YOB: 2000     A battery of tests must be done to determine if you are in suitable health to undergo a kidney transplant.  All  the recommended studies must be completed and received by the transplant team before you can be presented to the transplant selection committee. Once all your evaluation is complete the committee will then decide if you are a suitable transplant candidate.  The following studies need to be obtained at home:    _X__Abdominal Ultrasound Z76.21 to assess blood flow and vasculature to and around kidneys    _X_ ABO Z76.21  A second blood type verification is required by UNOS for activation on the kidney transplant waitlist.      You and your doctor should feel free to contact us at any time, if there are questions or concerns about these tests or the transplant evaluation process.    Sincerely,    Barbara Granados MD  Medical Director, Kidney & Kidney/Pancreas Transplantation      [facility]  [address] [place] [address]  [phone]

## 2024-11-04 NOTE — TELEPHONE ENCOUNTER
Spoke to pts mother Tahmina in regards to status. Pt is in need of ABD US, ABO, Hepatology clearance and last GYN note. Tahmina stated she will get with Hepatologist for clearance and I will fax referral to PCP Beau Pascual @ [facility]. Fax number provided to pts mother.

## 2024-11-04 NOTE — PROGRESS NOTES
Pt had pap done, will get GYN note faxed here.  She is calling loca hepatologist.  Still needs abdominal ultrasound and 2nd ABO, she will get both done localy, order sheet sent to pt

## 2024-11-04 NOTE — TELEPHONE ENCOUNTER
----- Message from Paz sent at 11/4/2024  9:45 AM CST -----  Regarding: Patient advice  Contact: Tahmina  [phone]            Name of Caller:  Tahmina  pt's mother      Contact Preference: [phone]    Nature of Call: Requesting a call to get list status on pt's behalf

## 2024-11-05 ENCOUNTER — EPISODE CHANGES (OUTPATIENT)
Dept: TRANSPLANT | Facility: CLINIC | Age: 24
End: 2024-11-05

## 2024-11-21 ENCOUNTER — TELEPHONE (OUTPATIENT)
Dept: TRANSPLANT | Facility: CLINIC | Age: 24
End: 2024-11-21
Payer: COMMERCIAL

## 2024-11-21 NOTE — TELEPHONE ENCOUNTER
"----- Message from Med Assistant Cavanaugh sent at 11/21/2024  2:29 PM CST -----  Regarding: FW: call back    ----- Message -----  From: Martin Carlos  Sent: 11/21/2024   1:22 PM CST  To: [facility] Pre-Kidney Transplant Non-Clinical  Subject: call back                                        Consult/Advisory:        Name Of Caller: Mom Tahmina     Contact Preference?:[phone]     What is the nature of the call?: Calling to speak w/ Calixto in regards to transplant status requesting call back         Additional Notes:  "Thank you for all that you do for our patients"  "

## 2024-11-21 NOTE — TELEPHONE ENCOUNTER
Spoke to pts mother Tahmina in regards to status. She was advised Coordinator has her chart and is reviewing it for committee. Will be ready for 12/6 meeting

## 2024-12-03 ENCOUNTER — EPISODE CHANGES (OUTPATIENT)
Dept: TRANSPLANT | Facility: CLINIC | Age: 24
End: 2024-12-03

## 2024-12-03 ENCOUNTER — TELEPHONE (OUTPATIENT)
Dept: TRANSPLANT | Facility: CLINIC | Age: 24
End: 2024-12-03
Payer: COMMERCIAL

## 2024-12-05 ENCOUNTER — TELEPHONE (OUTPATIENT)
Dept: TRANSPLANT | Facility: CLINIC | Age: 24
End: 2024-12-05
Payer: COMMERCIAL

## 2024-12-05 NOTE — TELEPHONE ENCOUNTER
MA notes per Pre Dialysis adherence form     FOR THE PAST THREE MONTHS:    0-Appt Adhrence  0-No show    No concerns with labs, care giving, transportation    Per adherence form unsure of any mental health issues.     Scanned in pt's media     Luanne Caro  Abdominal Transplant [place]

## 2024-12-06 ENCOUNTER — TELEPHONE (OUTPATIENT)
Dept: TRANSPLANT | Facility: CLINIC | Age: 24
End: 2024-12-06
Payer: COMMERCIAL

## 2024-12-06 ENCOUNTER — COMMITTEE REVIEW (OUTPATIENT)
Dept: TRANSPLANT | Facility: CLINIC | Age: 24
End: 2024-12-06
Payer: COMMERCIAL

## 2024-12-06 NOTE — COMMITTEE REVIEW
Native Organ Dx: Hypertensive Nephrosclerosis      SELECTION COMMITTEE NOTE    Carly Wilson was presented at selection committee on12/06/24  .  Patient met selection criteria for kidney transplant related to CKD, Stage 4 due to   Hypertensive Nephrosclerosis.  No absolute contraindications to transplant at this time.  Patient will be placed on the cadaveric wait list pending Assessment by Transplant Hepatology and final financial approval from insurance company.  Patient will return to clinic for routine appointment in one year(s). Patient does not meet criteria for High KDPI kidney offer. Patient does meet HCV+ donor offer. Patient does not meet criteria for dual/enbloc, due to committee. Planned immunosuppression Thymoglobulin.      Note written by Geraldine James RN  ===============================================

## 2024-12-06 NOTE — TELEPHONE ENCOUNTER
Returned call, confirmed that patient has been approved pending Transplant hepatology assessment and recommendations.  She reports that pt is scheduled for a liver scan on Monday 12/09/24 and  appt with her local Hepatologist on Tuesday, 12/10/24, she will have those reports faxed over for review.  ----- Message from Anisha sent at 12/6/2024  2:20 PM CST -----  Regarding: Consult/Advisory  Contact: Tahmina @  [phone]  Consult/Advisory     Name Of Caller: Tahmina (mother)      Contact Preference: [phone]     Nature of call: message is for Geraldine, pt's mother is calling to see what was the outcome of today's board meeting about pt being accepted. Asking for a call back

## 2024-12-20 ENCOUNTER — TELEPHONE (OUTPATIENT)
Dept: TRANSPLANT | Facility: CLINIC | Age: 24
End: 2024-12-20
Payer: COMMERCIAL

## 2024-12-20 DIAGNOSIS — N18.6 END STAGE RENAL DISEASE: ICD-10-CM

## 2024-12-20 DIAGNOSIS — B18.1 CHRONIC VIRAL HEPATITIS B WITHOUT DELTA AGENT AND WITHOUT COMA: Primary | ICD-10-CM

## 2024-12-20 DIAGNOSIS — Z76.82 ORGAN TRANSPLANT CANDIDATE: ICD-10-CM

## 2025-03-07 DIAGNOSIS — Z76.82 AWAITING ORGAN TRANSPLANT STATUS: Primary | ICD-10-CM

## 2025-04-03 ENCOUNTER — TELEPHONE (OUTPATIENT)
Dept: TRANSPLANT | Facility: CLINIC | Age: 25
End: 2025-04-03
Payer: COMMERCIAL

## 2025-04-03 PROCEDURE — 86832 HLA CLASS I HIGH DEFIN QUAL: CPT | Mod: TXP | Performed by: NURSE PRACTITIONER

## 2025-04-03 PROCEDURE — 36415 COLL VENOUS BLD VENIPUNCTURE: CPT | Mod: TXP

## 2025-04-03 PROCEDURE — 86833 HLA CLASS II HIGH DEFIN QUAL: CPT | Mod: TXP | Performed by: NURSE PRACTITIONER

## 2025-04-03 NOTE — TELEPHONE ENCOUNTER
Returned call, spoke to Tahmina, confirmed that labs were sent, no questions or concerns at present time.  ----- Message from PoachIt sent at 4/3/2025  8:56 AM CDT -----  Regarding: Consult/Advisory  Contact: :Tahmina baig   Consult/Advisory Name Of Caller:Tahmina mom  Contact Preference:[phone] Nature of call:Patients mom is calling to let Geraldine know that labs are being mailed today. Requesting a call back

## 2025-04-07 ENCOUNTER — TELEPHONE (OUTPATIENT)
Dept: TRANSPLANT | Facility: CLINIC | Age: 25
End: 2025-04-07
Payer: COMMERCIAL

## 2025-04-07 ENCOUNTER — LAB VISIT (OUTPATIENT)
Dept: LAB | Facility: HOSPITAL | Age: 25
End: 2025-04-07
Payer: COMMERCIAL

## 2025-04-07 DIAGNOSIS — Z76.82 AWAITING ORGAN TRANSPLANT STATUS: Primary | ICD-10-CM

## 2025-04-07 DIAGNOSIS — Z76.82 AWAITING ORGAN TRANSPLANT STATUS: ICD-10-CM

## 2025-04-07 NOTE — TELEPHONE ENCOUNTER
----- Message from Tania Mcdaniels sent at 4/4/2025  4:53 PM CDT -----  Regarding: FW: Requesting a call back  Contact: [phone]    ----- Message -----  From: Funmilayo Valerio  Sent: 4/4/2025  10:20 AM CDT  To: [facility] Pre-Kidney Transplant Clinical  Subject: Requesting a call back                           Consult/AdvisoryName Of Caller:Carly Duarte Preference:[phone] of call: Requesting a call back in regards to a time sensitive matter (labs)Requesting a call back

## 2025-04-08 DIAGNOSIS — Z76.82 ORGAN TRANSPLANT CANDIDATE: Primary | ICD-10-CM

## 2025-04-11 ENCOUNTER — TELEPHONE (OUTPATIENT)
Dept: TRANSPLANT | Facility: CLINIC | Age: 25
End: 2025-04-11
Payer: COMMERCIAL

## 2025-04-11 NOTE — PROGRESS NOTES
Received call from Dr Ruth stating that pt has 2 potential living donors and would like to have them worked up and asked me to get the process started.   Several attempts to contact pt and her mom, left vm message sent email to pt with living donor information.

## 2025-04-24 ENCOUNTER — LAB VISIT (OUTPATIENT)
Dept: LAB | Facility: HOSPITAL | Age: 25
End: 2025-04-24
Payer: COMMERCIAL

## 2025-04-24 DIAGNOSIS — Z76.82 ORGAN TRANSPLANT CANDIDATE: ICD-10-CM

## 2025-04-24 DIAGNOSIS — Z76.82 ORGAN TRANSPLANT CANDIDATE: Primary | ICD-10-CM

## 2025-04-24 PROCEDURE — 86825 HLA X-MATH NON-CYTOTOXIC: CPT | Mod: 91,TXP | Performed by: NURSE PRACTITIONER

## 2025-04-24 PROCEDURE — 86825 HLA X-MATH NON-CYTOTOXIC: CPT | Mod: TXP | Performed by: NURSE PRACTITIONER

## 2025-04-24 PROCEDURE — 86826 HLA X-MATCH NONCYTOTOXC ADDL: CPT | Mod: TXP | Performed by: NURSE PRACTITIONER

## 2025-04-24 PROCEDURE — 86826 HLA X-MATCH NONCYTOTOXC ADDL: CPT | Mod: 91,TXP | Performed by: NURSE PRACTITIONER

## 2025-04-25 LAB
B CELL RESULTS - XM ALLO: NEGATIVE
B CELL RESULTS - XM ALLO: NEGATIVE
B MCS AVERAGE - XM ALLO: 11.2
B MCS AVERAGE - XM ALLO: 2.3
DONOR MRN: NORMAL
DONOR MRN: NORMAL
FXMAL TESTING DATE: NORMAL
FXMAL TESTING DATE: NORMAL
HLA FLOW CROSSMATCH (ALLO) INTERPRETATION: NORMAL
SERUM COLLECTION DT - XM ALLO: NORMAL
SERUM COLLECTION DT - XM ALLO: NORMAL
T CELL RESULTS - XM ALLO: NEGATIVE
T CELL RESULTS - XM ALLO: NEGATIVE
T MCS AVERAGE - XM ALLO: -5.5
T MCS AVERAGE - XM ALLO: 1

## 2025-08-12 ENCOUNTER — TELEPHONE (OUTPATIENT)
Dept: TRANSPLANT | Facility: CLINIC | Age: 25
End: 2025-08-12
Payer: COMMERCIAL

## 2025-08-12 ENCOUNTER — EPISODE CHANGES (OUTPATIENT)
Dept: TRANSPLANT | Facility: CLINIC | Age: 25
End: 2025-08-12

## 2025-08-12 DIAGNOSIS — N18.6 END STAGE RENAL DISEASE: Primary | ICD-10-CM

## 2025-08-12 DIAGNOSIS — Z76.82 ORGAN TRANSPLANT CANDIDATE: ICD-10-CM

## 2025-08-18 ENCOUNTER — TELEPHONE (OUTPATIENT)
Dept: TRANSPLANT | Facility: CLINIC | Age: 25
End: 2025-08-18
Payer: COMMERCIAL

## 2025-09-05 ENCOUNTER — HOSPITAL ENCOUNTER (OUTPATIENT)
Dept: RADIOLOGY | Facility: HOSPITAL | Age: 25
Discharge: HOME | End: 2025-09-05
Attending: NURSE PRACTITIONER
Payer: COMMERCIAL

## 2025-09-05 ENCOUNTER — HOSPITAL ENCOUNTER (OUTPATIENT)
Dept: CARDIOLOGY | Facility: HOSPITAL | Age: 25
Discharge: HOME | End: 2025-09-05
Attending: NURSE PRACTITIONER
Payer: COMMERCIAL

## 2025-09-05 ENCOUNTER — TELEPHONE (OUTPATIENT)
Dept: TRANSPLANT | Facility: CLINIC | Age: 25
End: 2025-09-05
Payer: COMMERCIAL

## 2025-09-05 VITALS
HEIGHT: 62 IN | HEART RATE: 80 BPM | SYSTOLIC BLOOD PRESSURE: 138 MMHG | DIASTOLIC BLOOD PRESSURE: 85 MMHG | BODY MASS INDEX: 38.35 KG/M2

## 2025-09-05 DIAGNOSIS — N18.6 END STAGE RENAL DISEASE: ICD-10-CM

## 2025-09-05 DIAGNOSIS — Z76.82 ORGAN TRANSPLANT CANDIDATE: ICD-10-CM

## 2025-09-05 LAB
ASCENDING AORTA: 2.4 CM
AV AREA BY CONTINUOUS VTI: 2.4 CM2
AV INDEX (PROSTH): 0.69
AV LVOT MEAN GRADIENT: 2 MMHG
AV LVOT PEAK GRADIENT: 3 MMHG
AV MEAN GRADIENT: 4 MMHG
AV PEAK GRADIENT: 7 MMHG
AV VALVE AREA BY VELOCITY RATIO: 2.1 CM²
AV VALVE AREA: 2.4 CM2
AV VELOCITY RATIO: 0.62
CV ECHO LV RWT: 0.26 CM
DOP CALC AO PEAK VEL: 1.3 M/S
DOP CALC AO VTI: 23.6 CM
DOP CALC LVOT AREA: 3.5 CM2
DOP CALC LVOT DIAMETER: 2.1 CM
DOP CALC LVOT PEAK VEL: 0.8 M/S
DOP CALCLVOT PEAK VEL VTI: 16.4 CM
E WAVE DECELERATION TIME: 173 MS
E/A RATIO: 1.31
E/E' RATIO: 13 M/S
ECHO EF ESTIMATED: 70 %
ECHO LV POSTERIOR WALL: 0.7 CM (ref 0.6–1.1)
FRACTIONAL SHORTENING: 40.7 % (ref 28–44)
GLOBAL LONGITUIDAL STRAIN: 13 %
INTERVENTRICULAR SEPTUM: 0.9 CM (ref 0.6–1.1)
IVC DIAMETER: 1.21 CM
LA MAJOR: 4.8 CM
LA MINOR: 4.7 CM
LA WIDTH: 2.6 CM
LEFT ATRIUM SIZE: 3.9 CM
LEFT ATRIUM VOLUME MOD: 25 ML
LEFT ATRIUM VOLUME: 41 CM3
LEFT INTERNAL DIMENSION IN SYSTOLE: 3.2 CM (ref 2.1–4)
LEFT VENTRICLE DIASTOLIC VOLUME: 139 ML
LEFT VENTRICLE SYSTOLIC VOLUME: 42 ML
LEFT VENTRICULAR INTERNAL DIMENSION IN DIASTOLE: 5.4 CM (ref 3.5–6)
LEFT VENTRICULAR MASS: 155 G
LV LATERAL E/E' RATIO: 13.9
LV SEPTAL E/E' RATIO: 12.1
Lab: 1.5 CM/M
Lab: 1.5 CM/M
MV PEAK A VEL: 0.74 M/S
MV PEAK E VEL: 0.97 M/S
OHS CV CPX PATIENT HEIGHT IN: 62
OHS CV RV/LV RATIO: 0.74 CM
RA MAJOR: 4.55 CM
RA PRESSURE ESTIMATED: 3 MMHG
RA WIDTH: 3.94 CM
RIGHT ATRIAL AREA: 17.2 CM2
RIGHT VENTRICLE DIASTOLIC BASEL DIMENSION: 4 CM
RV TISSUE DOPPLER FREE WALL SYSTOLIC VELOCITY 1 (APICAL 4 CHAMBER VIEW): 7.03 CM/S
SINUS: 2.4 CM
STJ: 1.8 CM
TDI LATERAL: 0.07 M/S
TDI SEPTAL: 0.08 M/S
TDI: 0.08 M/S
TRICUSPID ANNULAR PLANE SYSTOLIC EXCURSION: 2.2 CM

## 2025-09-05 PROCEDURE — 71046 X-RAY EXAM CHEST 2 VIEWS: CPT | Mod: TC,TXP

## 2025-09-05 PROCEDURE — 93356 MYOCRD STRAIN IMG SPCKL TRCK: CPT | Mod: TXP
